# Patient Record
Sex: FEMALE | Race: WHITE | NOT HISPANIC OR LATINO
[De-identification: names, ages, dates, MRNs, and addresses within clinical notes are randomized per-mention and may not be internally consistent; named-entity substitution may affect disease eponyms.]

---

## 2024-05-19 ENCOUNTER — NON-APPOINTMENT (OUTPATIENT)
Age: 24
End: 2024-05-19

## 2024-05-20 ENCOUNTER — APPOINTMENT (OUTPATIENT)
Dept: OTOLARYNGOLOGY | Facility: CLINIC | Age: 24
End: 2024-05-20
Payer: COMMERCIAL

## 2024-05-20 ENCOUNTER — NON-APPOINTMENT (OUTPATIENT)
Age: 24
End: 2024-05-20

## 2024-05-20 VITALS
HEART RATE: 84 BPM | BODY MASS INDEX: 27.6 KG/M2 | WEIGHT: 150 LBS | SYSTOLIC BLOOD PRESSURE: 131 MMHG | TEMPERATURE: 98.9 F | DIASTOLIC BLOOD PRESSURE: 90 MMHG | HEIGHT: 62 IN | OXYGEN SATURATION: 98 %

## 2024-05-20 DIAGNOSIS — K12.0 RECURRENT ORAL APHTHAE: ICD-10-CM

## 2024-05-20 DIAGNOSIS — Z78.9 OTHER SPECIFIED HEALTH STATUS: ICD-10-CM

## 2024-05-20 PROBLEM — Z00.00 ENCOUNTER FOR PREVENTIVE HEALTH EXAMINATION: Status: ACTIVE | Noted: 2024-05-20

## 2024-05-20 PROCEDURE — 99204 OFFICE O/P NEW MOD 45 MIN: CPT | Mod: 25

## 2024-05-20 PROCEDURE — 31575 DIAGNOSTIC LARYNGOSCOPY: CPT

## 2024-05-20 NOTE — PROCEDURE
[de-identified] : - Pre-operative Diagnosis: Oral mucosal sores Post-operative Diagnosis: Normal exam Anesthesia: Topical - 1 % Lidocaine/Phenylephrine Procedure:  Flexible Laryngoscopy   Procedure Details:   The patient was placed in the sitting position.  After decongestant and anesthesia were applied the laryngoscope was passed.  The nasal cavities, nasopharynx, oropharynx, hypopharynx, and larynx were all examined.  Vocal folds were examined during respiration and phonation.  The following findings were noted:  Findings:   Nose: Septum is midline, turbinates are normal, nasal airways patent, mucosa normal Nasopharynx: Adenoids normal, no masses, eustachian tube normal Oropharynx: Pharyngeal walls symmetric and without lesion. Tonsils/fossae symmetric Hypopharynx: Hypopharynx and pyriform sinuses without lesion. No masses or asymmetry.  No pooling of secretions. Larynx:  Epiglottis and aryepiglottic folds were sharp and crisp bilaterally.  Bilateral false and true vocal folds normal appearance. Bilateral vocal folds fully mobile and symmetric.  Airway was widely patent.  Condition: Stable.  Patient tolerated procedure well.  Complications: None

## 2024-05-20 NOTE — ASSESSMENT
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 On exam I do not see  any active lesions at this time. No tumors masses or lesions on laryngoscopy. At this time I am recommending supportive measures, including OTC topical anesthetic solutions, changing toothbrush/toothpaste, and managing stress. I am also recommending she consult with Dr. Shane Wagner for alternatvie recommendations.   Plan: -supportive care -Referral to Dr. Wagner -return should she develop any worsening symptoms

## 2024-05-20 NOTE — HISTORY OF PRESENT ILLNESS
[de-identified] : 5/20/24 24F here complaining of chronic mouth and throat sores. This first started in 2019 and she says that they jose started to occur around her gums/inner lip area but recently started to migrate to the back of her throat. Says that these can be very painful and will have pain upon swallowing. No actual issues with eating or swallowing. No history of tobacco smoking. She has a history of HSV-1 since she was a child, she will get outbreaks on her face